# Patient Record
Sex: FEMALE | Race: OTHER
[De-identification: names, ages, dates, MRNs, and addresses within clinical notes are randomized per-mention and may not be internally consistent; named-entity substitution may affect disease eponyms.]

---

## 2020-07-23 ENCOUNTER — HOSPITAL ENCOUNTER (EMERGENCY)
Dept: HOSPITAL 54 - ER | Age: 30
Discharge: HOME | End: 2020-07-23
Payer: MEDICAID

## 2020-07-23 VITALS — SYSTOLIC BLOOD PRESSURE: 119 MMHG | DIASTOLIC BLOOD PRESSURE: 75 MMHG

## 2020-07-23 VITALS — BODY MASS INDEX: 43.39 KG/M2 | HEIGHT: 66 IN | WEIGHT: 270 LBS

## 2020-07-23 DIAGNOSIS — F41.9: Primary | ICD-10-CM

## 2020-07-23 DIAGNOSIS — U07.1: ICD-10-CM

## 2020-07-23 NOTE — NUR
Pt is medically clear for d/c. Patient discharged to home in stable condition. 
Written and verbal after care instructions given. Patient verbalizes 
understanding of instruction.

## 2020-07-23 NOTE — NUR
BIB EMS C/O SOB AND FEELING SICK PER EMS REPORT. PER PT "ITS ANXIETY, I GOT 
ANXIOUS BECAUSE I TESTED (+) FOR COVID ION FRIDAY BUT I FEEL BETTER NOW".. pt 
was placed on a monitor ,. VSS.